# Patient Record
Sex: FEMALE | Race: WHITE | NOT HISPANIC OR LATINO
[De-identification: names, ages, dates, MRNs, and addresses within clinical notes are randomized per-mention and may not be internally consistent; named-entity substitution may affect disease eponyms.]

---

## 2018-11-30 PROBLEM — Z00.00 ENCOUNTER FOR PREVENTIVE HEALTH EXAMINATION: Status: ACTIVE | Noted: 2018-11-30

## 2018-12-11 ENCOUNTER — APPOINTMENT (OUTPATIENT)
Dept: DERMATOLOGY | Facility: CLINIC | Age: 58
End: 2018-12-11
Payer: COMMERCIAL

## 2018-12-11 ENCOUNTER — TRANSCRIPTION ENCOUNTER (OUTPATIENT)
Age: 58
End: 2018-12-11

## 2018-12-11 DIAGNOSIS — Z91.89 OTHER SPECIFIED PERSONAL RISK FACTORS, NOT ELSEWHERE CLASSIFIED: ICD-10-CM

## 2018-12-11 PROCEDURE — 99203 OFFICE O/P NEW LOW 30 MIN: CPT

## 2019-01-07 ENCOUNTER — LABORATORY RESULT (OUTPATIENT)
Age: 59
End: 2019-01-07

## 2019-01-07 ENCOUNTER — APPOINTMENT (OUTPATIENT)
Dept: DERMATOLOGY | Facility: CLINIC | Age: 59
End: 2019-01-07
Payer: COMMERCIAL

## 2019-01-07 DIAGNOSIS — D03.60 MELANOMA IN SITU OF UNSPECIFIED UPPER LIMB, INCLUDING SHOULDER: ICD-10-CM

## 2019-01-07 PROCEDURE — 11606 EXC TR-EXT MAL+MARG >4 CM: CPT

## 2019-01-07 PROCEDURE — 12032 INTMD RPR S/A/T/EXT 2.6-7.5: CPT

## 2019-01-08 NOTE — PHYSICAL EXAM
[Alert] : alert [Oriented x 3] : ~L oriented x 3 [Well Nourished] : well nourished [Conjunctiva Non-injected] : conjunctiva non-injected [No Visual Lymphadenopathy] : no visual  lymphadenopathy [No Clubbing] : no clubbing [No Edema] : no edema [No Bromhidrosis] : no bromhidrosis [No Chromhidrosis] : no chromhidrosis [FreeTextEntry3] : L posterior shoulder with linear surgical scar, 6cm

## 2019-01-08 NOTE — HISTORY OF PRESENT ILLNESS
[FreeTextEntry1] : MIS re-excision [de-identified] : Date of consult: 12/11/18\par Self-referred\par \par 58F here for re-excision of MIS on L posterior shoulder. She does not know how long the lesion was present prior to biopsy but thinks it was not particularly large; it was found on a skin check with a dermatology PA (Margarita Jeong of [facility]). The lesion was biopsied 8/28/18 and returned as a compound nevus with moderate atypia. It was excised 10/4/18; excision path was read as an early MIS arising in a compound dysplastic nevus with moderate atypia, close to peripheral tissue margins. It was then reexcised 11/12/18 and read as residual MIS, extending to approximately 1.0mm from the closest peripheral margin. Our pathologists concurred with prior dermpath, and thus she is here today for re-excision given the lesion's proximity to the peripheral margin.\par \par She otherwise feels well. Denies other h/o skin cancer or any cancer. Has no first-degree relatives with skin cancer.\par \par SHx: Lives in [place] (in [place], [place]). Here with her two daughters who are teachers. She works in the [employer]. Has a brother who is the chief computer officer for [employer]. \par \par History of HIV or hepatitis: NO\par Blood thinners: NO\par Antibiotic Prophylaxis: NO\par Medical implants: NO \par \par Outside pathology results:\par \par Biopsy, 8/28/18, L posterior shoulder, specimen #[id number], [facility]: compound nevus with architectural disorder and moderate cytologic atypia (dysplastic nevus); extending to the peripheral and deep tissue edges.\par Excision 10/1/18, L posterior shoulder, specimen #[id number], [facility]: early melanoma in situ arising in a compound dysplastic nevus; close to peripheral tissue margins.\par Re-excision 11/12/18, L posterior shoulder, specimen #[id number], [facility]: residual melanoma in situ, narrowly excised. The neoplasm extends to approximately 1.0mm from the closest peripheral margin.

## 2022-08-12 ENCOUNTER — OFFICE (OUTPATIENT)
Dept: URBAN - METROPOLITAN AREA CLINIC 122 | Facility: CLINIC | Age: 62
Setting detail: OPHTHALMOLOGY
End: 2022-08-12
Payer: COMMERCIAL

## 2022-08-12 DIAGNOSIS — H35.363: ICD-10-CM

## 2022-08-12 DIAGNOSIS — H25.13: ICD-10-CM

## 2022-08-12 DIAGNOSIS — H02.822: ICD-10-CM

## 2022-08-12 PROCEDURE — 92004 COMPRE OPH EXAM NEW PT 1/>: CPT | Performed by: OPHTHALMOLOGY

## 2022-08-12 PROCEDURE — 92250 FUNDUS PHOTOGRAPHY W/I&R: CPT | Performed by: OPHTHALMOLOGY

## 2022-08-12 ASSESSMENT — REFRACTION_CURRENTRX
OS_CYLINDER: SPH
OD_VPRISM_DIRECTION: SV
OD_CYLINDER: SPH
OD_OVR_VA: 20/
OS_SPHERE: +2.50
OD_SPHERE: +2.50
OS_VPRISM_DIRECTION: SV
OS_OVR_VA: 20/

## 2022-08-12 ASSESSMENT — SPHEQUIV_DERIVED
OD_SPHEQUIV: 0.25
OS_SPHEQUIV: 0.375

## 2022-08-12 ASSESSMENT — REFRACTION_AUTOREFRACTION
OD_AXIS: 51
OD_SPHERE: +0.50
OS_CYLINDER: -0.25
OS_SPHERE: +0.50
OS_AXIS: 113
OD_CYLINDER: -0.50

## 2022-08-12 ASSESSMENT — CONFRONTATIONAL VISUAL FIELD TEST (CVF)
OS_FINDINGS: FULL
OD_FINDINGS: FULL

## 2022-08-12 ASSESSMENT — VISUAL ACUITY
OD_BCVA: 20/25
OS_BCVA: 20/20

## 2022-08-12 ASSESSMENT — TONOMETRY
OS_IOP_MMHG: 12
OD_IOP_MMHG: 11

## 2023-08-16 ENCOUNTER — OFFICE (OUTPATIENT)
Dept: URBAN - METROPOLITAN AREA CLINIC 122 | Facility: CLINIC | Age: 63
Setting detail: OPHTHALMOLOGY
End: 2023-08-16
Payer: COMMERCIAL

## 2023-08-16 DIAGNOSIS — H25.13: ICD-10-CM

## 2023-08-16 DIAGNOSIS — H35.363: ICD-10-CM

## 2023-08-16 PROCEDURE — 92250 FUNDUS PHOTOGRAPHY W/I&R: CPT | Performed by: OPHTHALMOLOGY

## 2023-08-16 PROCEDURE — 92014 COMPRE OPH EXAM EST PT 1/>: CPT | Performed by: OPHTHALMOLOGY

## 2023-08-16 ASSESSMENT — REFRACTION_CURRENTRX
OS_SPHERE: +2.50
OS_VPRISM_DIRECTION: SV
OD_SPHERE: +2.50
OS_CYLINDER: SPH
OD_VPRISM_DIRECTION: SV
OD_OVR_VA: 20/
OD_CYLINDER: SPH
OS_OVR_VA: 20/

## 2023-08-16 ASSESSMENT — VISUAL ACUITY
OS_BCVA: 20/20
OD_BCVA: 20/25

## 2023-08-16 ASSESSMENT — SPHEQUIV_DERIVED
OS_SPHEQUIV: 0.75
OD_SPHEQUIV: -0.125

## 2023-08-16 ASSESSMENT — CONFRONTATIONAL VISUAL FIELD TEST (CVF)
OD_FINDINGS: FULL
OS_FINDINGS: FULL

## 2023-08-16 ASSESSMENT — TONOMETRY
OS_IOP_MMHG: 16
OD_IOP_MMHG: 16

## 2023-08-16 ASSESSMENT — REFRACTION_AUTOREFRACTION
OS_AXIS: 92
OD_SPHERE: +0.25
OS_CYLINDER: -0.50
OD_CYLINDER: -0.75
OS_SPHERE: +1.00
OD_AXIS: 42

## 2024-09-11 ENCOUNTER — OFFICE (OUTPATIENT)
Dept: URBAN - METROPOLITAN AREA CLINIC 122 | Facility: CLINIC | Age: 64
Setting detail: OPHTHALMOLOGY
End: 2024-09-11
Payer: COMMERCIAL

## 2024-09-11 DIAGNOSIS — H52.4: ICD-10-CM

## 2024-09-11 DIAGNOSIS — H35.363: ICD-10-CM

## 2024-09-11 DIAGNOSIS — H25.13: ICD-10-CM

## 2024-09-11 PROBLEM — H52.7 REFRACTIVE ERROR: Status: ACTIVE | Noted: 2024-09-11

## 2024-09-11 PROCEDURE — 92134 CPTRZ OPH DX IMG PST SGM RTA: CPT | Performed by: OPHTHALMOLOGY

## 2024-09-11 PROCEDURE — 92015 DETERMINE REFRACTIVE STATE: CPT | Performed by: OPHTHALMOLOGY

## 2024-09-11 PROCEDURE — 92014 COMPRE OPH EXAM EST PT 1/>: CPT | Performed by: OPHTHALMOLOGY

## 2024-09-11 ASSESSMENT — CONFRONTATIONAL VISUAL FIELD TEST (CVF)
OD_FINDINGS: FULL
OS_FINDINGS: FULL

## 2025-01-25 ENCOUNTER — NON-APPOINTMENT (OUTPATIENT)
Age: 65
End: 2025-01-25